# Patient Record
(demographics unavailable — no encounter records)

---

## 2025-06-23 NOTE — HISTORY OF PRESENT ILLNESS
[de-identified] : 27 year old male presents for clogged ear Resides at [facility] accompanied by staff member Yessenia Denies s/s of otalgia, otorrhea, recent fevers or ear infections. No concerns with hearing.

## 2025-06-23 NOTE — ASSESSMENT
[FreeTextEntry1] : 27 year M  with bilateral cerumen impaction. Patient tolerated cerumen removal without complaints.   Physical exam shows bilateral  ears normal EAC/TM. Patient states hearing is baseline after cerumen removal not interested in hearing test.   Recommend: Cerumen Impaction -Discussed not using q-tips or instruments to remove wax -Discussed that the ear is a self cleaning structure and just allow it clean itself. If wax builds up can try debrox. Once it gets impacted recommend return to get it cleaned out.   -Return to clinic as needed or sooner if new/worsen symptoms present